# Patient Record
Sex: FEMALE
[De-identification: names, ages, dates, MRNs, and addresses within clinical notes are randomized per-mention and may not be internally consistent; named-entity substitution may affect disease eponyms.]

---

## 2024-09-16 ENCOUNTER — APPOINTMENT (OUTPATIENT)
Age: 29
End: 2024-09-16

## 2024-09-16 VITALS — RESPIRATION RATE: 100 BRPM | SYSTOLIC BLOOD PRESSURE: 108 MMHG | HEART RATE: 67 BPM | DIASTOLIC BLOOD PRESSURE: 71 MMHG

## 2024-09-16 DIAGNOSIS — Z00.00 ENCOUNTER FOR GENERAL ADULT MEDICAL EXAMINATION W/OUT ABNORMAL FINDINGS: ICD-10-CM

## 2024-09-16 NOTE — HISTORY OF PRESENT ILLNESS
[FreeTextEntry8] : Patient presents for preventative wellness visit.  Lipid Panel, Hgb A1C and BP screening at this time.  Follow up with PCP annually and as needed.

## 2024-09-16 NOTE — PLAN
[FreeTextEntry1] :  Follow up to wellness center for f/u, lab review.  Follow up with PCP annually and as needed.

## 2024-09-17 LAB
CHOLEST SERPL-MCNC: 247 MG/DL
ESTIMATED AVERAGE GLUCOSE: 103 MG/DL
HBA1C MFR BLD HPLC: 5.2 %
HDLC SERPL-MCNC: 89 MG/DL
LDLC SERPL CALC-MCNC: 152 MG/DL
NONHDLC SERPL-MCNC: 159 MG/DL
TRIGL SERPL-MCNC: 45 MG/DL

## 2024-09-18 ENCOUNTER — TRANSCRIPTION ENCOUNTER (OUTPATIENT)
Age: 29
End: 2024-09-18